# Patient Record
Sex: MALE | Race: BLACK OR AFRICAN AMERICAN | NOT HISPANIC OR LATINO | URBAN - METROPOLITAN AREA
[De-identification: names, ages, dates, MRNs, and addresses within clinical notes are randomized per-mention and may not be internally consistent; named-entity substitution may affect disease eponyms.]

---

## 2018-05-13 ENCOUNTER — EMERGENCY (EMERGENCY)
Facility: HOSPITAL | Age: 23
LOS: 1 days | Discharge: HOME | End: 2018-05-13
Attending: EMERGENCY MEDICINE
Payer: SELF-PAY

## 2018-05-13 VITALS
SYSTOLIC BLOOD PRESSURE: 144 MMHG | OXYGEN SATURATION: 100 % | RESPIRATION RATE: 18 BRPM | DIASTOLIC BLOOD PRESSURE: 91 MMHG | TEMPERATURE: 98 F | HEART RATE: 88 BPM

## 2018-05-13 VITALS — HEIGHT: 74 IN | WEIGHT: 235.01 LBS

## 2018-05-13 PROCEDURE — 99053 MED SERV 10PM-8AM 24 HR FAC: CPT

## 2018-05-13 PROCEDURE — 99283 EMERGENCY DEPT VISIT LOW MDM: CPT

## 2018-05-13 PROCEDURE — 99283 EMERGENCY DEPT VISIT LOW MDM: CPT | Mod: 25

## 2018-05-13 RX ORDER — OXYCODONE AND ACETAMINOPHEN 5; 325 MG/1; MG/1
1 TABLET ORAL ONCE
Qty: 0 | Refills: 0 | Status: DISCONTINUED | OUTPATIENT
Start: 2018-05-13 | End: 2018-05-13

## 2018-05-13 RX ADMIN — Medication 1 TABLET(S): at 04:25

## 2018-05-13 RX ADMIN — OXYCODONE AND ACETAMINOPHEN 1 TABLET(S): 5; 325 TABLET ORAL at 04:25

## 2018-05-13 NOTE — ED PROVIDER NOTE - OBJECTIVE STATEMENT
Patient is a 23 y/o male c/o of toothache for the past two days. Patient states pain is in his left lower tooth. Patient admits tooth cracked and since then he has been experiencing pain. Patient denies seeing dentist since pain started. Patient denies bleeding gums, SOB, dysphagia, fevers, neck pain.

## 2018-05-13 NOTE — ED PROVIDER NOTE - ATTENDING CONTRIBUTION TO CARE
I personally saw the patient with the PA, and completed the key components of the history and physical exam. I then discussed the management plan with the PA.   gen in nad reps cardaic no murmur heent multiple caries, no abscess  agree with pa plan of care

## 2025-03-20 NOTE — ED PROVIDER NOTE - PHYSICAL EXAMINATION
Case Management Discharge Planning    Admission Date: 3/17/2025  GMLOS: 2.9  ALOS: 3    6-Clicks ADL Score: 24  6-Clicks Mobility Score: 20      Anticipated Discharge Dispo: Discharge Disposition: D/T to SNF with Medicare cert in anticipation of skilled care (03)  Discharge Contact Phone Number: [phone]    DME Needed: Yes    DME Ordered: not yet    Action(s) Taken: Met with patient who just got up walking with RN and said it was the best he felt so far and thinks he did really well. He thinks he will be discharging to a local hotel room for a night since he lives a couple hours away and his grandson can pick him up. He is requesting an order for a FWW. Request sent to MD for order. Discussed home health is ordered, pt not sure where he is discharging to after hotel, it may be in [place] so he is going to discuss with family and let RN CM know. Patient still pending medical clearance.    Escalations Completed: None    Medically Clear: No    Next Steps: F/U with pt for FWW choice and discuss HH    Barriers to Discharge: Medical clearance    Is the patient up for discharge tomorrow: No    1045- Patient signed choice for [facility] and [address] [place] [address] and faxed to [facility].    112-Spoke with Cecy, patient's daughter and she says patient is discharging to [place] after going to a hotel in [place] for tonight. She will get [place] address and call CM back as they would like home health.     [id number]-Pat, patient's ex-wife called to make sure pt has a walker at discharge. She will call Cecy and let her know that [facility] still needs address for home health referral.      General: Well appearing, sitting comfortably Head: NC/AT Eyes: PERRL, conjunctiva pink, sclera white bilaterally Ears: TM's clear bilaterally Throat/Mouth: Tenderness of tooth # 18, tooth cracked, no gingival bleeding, fair dentition, uvula is midline, tongue symmetrical Neck: Neck supple Cardio: S1/S2, no murmurs Resp; Clear to auscultation bilterally, no wheezes, rales or rhonchi Skin: Warm, dry without rashes